# Patient Record
Sex: MALE | Race: OTHER
[De-identification: names, ages, dates, MRNs, and addresses within clinical notes are randomized per-mention and may not be internally consistent; named-entity substitution may affect disease eponyms.]

---

## 2020-07-05 ENCOUNTER — HOSPITAL ENCOUNTER (EMERGENCY)
Dept: HOSPITAL 74 - FER | Age: 30
Discharge: HOME | End: 2020-07-05
Payer: SELF-PAY

## 2020-07-05 VITALS — BODY MASS INDEX: 20 KG/M2

## 2020-07-05 VITALS — HEART RATE: 90 BPM | DIASTOLIC BLOOD PRESSURE: 104 MMHG | TEMPERATURE: 98.1 F | SYSTOLIC BLOOD PRESSURE: 146 MMHG

## 2020-07-05 DIAGNOSIS — S20.212A: Primary | ICD-10-CM
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
